# Patient Record
Sex: MALE | Race: WHITE | NOT HISPANIC OR LATINO | Employment: OTHER | URBAN - METROPOLITAN AREA
[De-identification: names, ages, dates, MRNs, and addresses within clinical notes are randomized per-mention and may not be internally consistent; named-entity substitution may affect disease eponyms.]

---

## 2024-09-22 ENCOUNTER — APPOINTMENT (OUTPATIENT)
Dept: RADIOLOGY | Facility: HOSPITAL | Age: 79
End: 2024-09-22
Payer: MEDICARE

## 2024-09-22 ENCOUNTER — HOSPITAL ENCOUNTER (EMERGENCY)
Facility: HOSPITAL | Age: 79
Discharge: HOME | End: 2024-09-22
Attending: STUDENT IN AN ORGANIZED HEALTH CARE EDUCATION/TRAINING PROGRAM
Payer: MEDICARE

## 2024-09-22 VITALS
SYSTOLIC BLOOD PRESSURE: 150 MMHG | OXYGEN SATURATION: 100 % | WEIGHT: 189 LBS | RESPIRATION RATE: 17 BRPM | DIASTOLIC BLOOD PRESSURE: 74 MMHG | HEART RATE: 57 BPM | HEIGHT: 74 IN | TEMPERATURE: 97.5 F | BODY MASS INDEX: 24.26 KG/M2

## 2024-09-22 DIAGNOSIS — S61.511A LACERATION OF RIGHT WRIST, INITIAL ENCOUNTER: Primary | ICD-10-CM

## 2024-09-22 PROCEDURE — 90715 TDAP VACCINE 7 YRS/> IM: CPT

## 2024-09-22 PROCEDURE — 2500000004 HC RX 250 GENERAL PHARMACY W/ HCPCS (ALT 636 FOR OP/ED)

## 2024-09-22 PROCEDURE — 73110 X-RAY EXAM OF WRIST: CPT | Mod: RT

## 2024-09-22 PROCEDURE — 73110 X-RAY EXAM OF WRIST: CPT | Mod: RIGHT SIDE | Performed by: RADIOLOGY

## 2024-09-22 PROCEDURE — 99283 EMERGENCY DEPT VISIT LOW MDM: CPT | Mod: 25

## 2024-09-22 PROCEDURE — 90471 IMMUNIZATION ADMIN: CPT

## 2024-09-22 PROCEDURE — 2500000001 HC RX 250 WO HCPCS SELF ADMINISTERED DRUGS (ALT 637 FOR MEDICARE OP)

## 2024-09-22 RX ORDER — LISINOPRIL 5 MG/1
5 TABLET ORAL 2 TIMES DAILY
COMMUNITY

## 2024-09-22 RX ORDER — BACITRACIN ZINC 500 UNIT/G
OINTMENT IN PACKET (EA) TOPICAL ONCE
Status: COMPLETED | OUTPATIENT
Start: 2024-09-22 | End: 2024-09-22

## 2024-09-22 RX ORDER — CLOPIDOGREL BISULFATE 75 MG/1
75 TABLET ORAL
COMMUNITY
Start: 2023-09-23

## 2024-09-22 RX ORDER — DONEPEZIL HYDROCHLORIDE 10 MG/1
10 TABLET, FILM COATED ORAL
COMMUNITY
Start: 2024-01-16

## 2024-09-22 RX ORDER — TAMSULOSIN HYDROCHLORIDE 0.4 MG/1
CAPSULE ORAL
COMMUNITY

## 2024-09-22 ASSESSMENT — COLUMBIA-SUICIDE SEVERITY RATING SCALE - C-SSRS
6. HAVE YOU EVER DONE ANYTHING, STARTED TO DO ANYTHING, OR PREPARED TO DO ANYTHING TO END YOUR LIFE?: NO
1. IN THE PAST MONTH, HAVE YOU WISHED YOU WERE DEAD OR WISHED YOU COULD GO TO SLEEP AND NOT WAKE UP?: NO
2. HAVE YOU ACTUALLY HAD ANY THOUGHTS OF KILLING YOURSELF?: NO

## 2024-09-22 ASSESSMENT — LIFESTYLE VARIABLES
EVER HAD A DRINK FIRST THING IN THE MORNING TO STEADY YOUR NERVES TO GET RID OF A HANGOVER: NO
TOTAL SCORE: 0
HAVE YOU EVER FELT YOU SHOULD CUT DOWN ON YOUR DRINKING: NO
EVER FELT BAD OR GUILTY ABOUT YOUR DRINKING: NO
HAVE PEOPLE ANNOYED YOU BY CRITICIZING YOUR DRINKING: NO

## 2024-09-22 ASSESSMENT — PAIN SCALES - GENERAL
PAINLEVEL_OUTOF10: 0 - NO PAIN
PAINLEVEL_OUTOF10: 0 - NO PAIN

## 2024-09-22 ASSESSMENT — PAIN DESCRIPTION - PAIN TYPE: TYPE: ACUTE PAIN

## 2024-09-22 ASSESSMENT — PAIN - FUNCTIONAL ASSESSMENT
PAIN_FUNCTIONAL_ASSESSMENT: 0-10
PAIN_FUNCTIONAL_ASSESSMENT: 0-10

## 2024-09-22 ASSESSMENT — PAIN DESCRIPTION - LOCATION: LOCATION: WRIST

## 2024-09-22 NOTE — DISCHARGE INSTRUCTIONS
I sent a referral to wound care clinic.  Please give the office a call and schedule appointment as soon as possible to further take care of your wound.    If you have any worsening symptoms of wrist pain, uncontrolled bleeding, weakness of your right hand, loss of sensation, fingers turning pale and blue, high fevers, or any other concerning symptoms, please return to the ED.     Thank you for allowing us to participate in your health care.    -[facility].

## 2024-09-22 NOTE — ED PROVIDER NOTES
EMERGENCY DEPARTMENT ENCOUNTER      Pt Name: Michi Rg  MRN: [id number]  Birthdate 1945  Date of evaluation: 9/22/2024  Provider: Susan Butts DO    CHIEF COMPLAINT       Chief Complaint   Patient presents with    Laceration     Right wrist/ hand. Cut on glass last night at 1030PM           HISTORY OF PRESENT ILLNESS    79-year-old male with past medical history of coronary artery disease status post stents placements on Plavix, who presents to the ED with laceration.  The patient states around 10:30 PM last night, having glass broke off and the glass shards lacerated his right wrist/hand.  He has 1 superficial laceration on the right wrist and 2 superficial lacerations on the hand/finger.  When the injury happens, wife dressed the wound and bleeding was controlled.  However, this morning while changing the dressing, bleeding recur and they were unable to stop it at home.  In the ED, he is hemodynamically stable.  The laceration on the wrist is oozing blood minimally.  He denies any fever, chills, weakness, loss of sensation, or increased pain.  He is concerned that there may be glass shards inside of the cut.       History provided by:  Patient and spouse   used: No        Nursing Notes were reviewed.    PAST MEDICAL HISTORY   History reviewed. No pertinent past medical history.      SURGICAL HISTORY       Past Surgical History:   Procedure Laterality Date    HERNIA REPAIR  04/10/2018    Hernia Repair    OTHER SURGICAL HISTORY  04/10/2018    Previous Stent Placement         CURRENT MEDICATIONS       Discharge Medication List as of 9/22/2024 12:53 PM        CONTINUE these medications which have NOT CHANGED    Details   clopidogrel (Plavix) 75 mg tablet Take 1 tablet (75 mg) by mouth., Starting Sat 9/23/2023, Historical Med      donepezil (Aricept) 10 mg tablet Take 1 tablet (10 mg) by mouth., Starting Tue 1/16/2024, Historical Med      aspirin-calcium carbonate 81 mg-300 mg  calcium(777 mg) tablet Take by mouth., Historical Med      FINASTERIDE ORAL Historical Med      lisinopril 5 mg tablet Take 1 tablet (5 mg) by mouth 2 times a day., Historical Med      tamsulosin (Flomax) 0.4 mg 24 hr capsule Take by mouth., Historical Med             ALLERGIES     Patient has no known allergies.    FAMILY HISTORY     No family history on file.       SOCIAL HISTORY       Social History     Socioeconomic History    Marital status: Single   Tobacco Use    Smoking status: Never    Smokeless tobacco: Never   Substance and Sexual Activity    Alcohol use: Yes     Alcohol/week: 7.0 standard drinks of alcohol     Types: 7 Glasses of wine per week    Drug use: Never     Social Determinants of Health     Financial Resource Strain: Low Risk  (9/12/2022)    Received from [facility]    Overall Financial Resource Strain (CARDIA)     Difficulty of Paying Living Expenses: Not hard at all   Food Insecurity: No Food Insecurity (9/12/2022)    Received from [facility]    Hunger Vital Sign     Worried About Running Out of Food in the Last Year: Never true     Ran Out of Food in the Last Year: Never true   Transportation Needs: No Transportation Needs (9/12/2022)    Received from [facility]    PRAPARE - Transportation     Lack of Transportation (Medical): No     Lack of Transportation (Non-Medical): No   Physical Activity: Insufficiently Active (11/16/2023)    Received from [facility]    Exercise Vital Sign     Days of Exercise per Week: 4 days     Minutes of Exercise per Session: 30 min   Stress: No Stress Concern Present (9/12/2022)    Received from [facility]    [employer] - Occupational Stress Questionnaire     Feeling of Stress : Only a little   Social Connections: Socially Integrated (9/12/2022)    Received from [facility]    Social Connection and Isolation Panel [NHANES]     Frequency of Communication with Friends and Family: More than three times a  week     Frequency of Social Gatherings with Friends and Family: Three times a week     Attends Christianity Services: More than 4 times per year     Active Member of Clubs or Organizations: Yes     Attends Club or Organization Meetings: 1 to 4 times per year     Marital Status:        PHYSICAL EXAM    (up to 7 for level 4, 8 or more for level 5)     ED Triage Vitals [09/22/24 0958]   Temperature Heart Rate Respirations BP   36 °C (96.8 °F) 56 16 (!) 151/95      Pulse Ox Temp Source Heart Rate Source Patient Position   99 % Temporal Monitor Sitting      BP Location FiO2 (%)     Left arm --       Physical Exam  Vitals and nursing note reviewed.   Constitutional:       General: He is not in acute distress.     Appearance: Normal appearance.   HENT:      Head: Normocephalic and atraumatic.   Eyes:      General: No scleral icterus.     Extraocular Movements: Extraocular movements intact.      Conjunctiva/sclera: Conjunctivae normal.   Cardiovascular:      Rate and Rhythm: Normal rate and regular rhythm.      Pulses: Normal pulses.      Heart sounds: Normal heart sounds. No murmur heard.  Pulmonary:      Effort: Pulmonary effort is normal. No respiratory distress.      Breath sounds: Normal breath sounds. No wheezing, rhonchi or rales.   Musculoskeletal:      Right wrist: Laceration and tenderness present. No swelling or bony tenderness. Normal range of motion. Normal pulse.      Right hand: Laceration present. Normal range of motion. Normal strength. Normal sensation. Normal capillary refill. Normal pulse.      Cervical back: Neck supple.      Comments: 1 cm superficial laceration on the volar aspect of the right wrist minimal active bleeding.  2x 0.5 cm superficial lacerations on the right hand.  He can flex and extend his right wrist well.  He can make an OK sign, thumb opposition/reposition, and abduction all his digits without difficulty.  Sensation is intact throughout.  Capillary refill is less than 2 seconds  and radial pulse is brisk.   Skin:     General: Skin is warm and dry.   Neurological:      Mental Status: He is alert and oriented to person, place, and time.   Psychiatric:         Mood and Affect: Mood normal.         Behavior: Behavior normal.        DIAGNOSTIC RESULTS     LABS:  Labs Reviewed - No data to display    All other labs were within normal range or not returned as of this dictation.    Imaging  XR wrist right 3+ views   Final Result   No acute osseous abnormality.  No radiopaque foreign body.   Signed by Kodak Gomez MD           Procedures  Procedures     EMERGENCY DEPARTMENT COURSE/MDM:     Diagnoses as of 09/22/24 1543   Laceration of right wrist, initial encounter        Medical Decision Making  This is a 79-year-old male with past medical history of CAD status post stents placement currently on Plavix, who presents to the ED with superficial lacerations on right upper extremity that he sustained from broken glass last evening at 10:30 PM.  Wife and patient states that they were able to control the bleeding last evening.  However, during the dressing changes this morning, bleeding recur and they were unable to control it at home.  In the ED, the patient is hemodynamically stable.  He does have a superficial laceration on the volar aspect of the right wrist that was actively oozing blood.  The other 2 superficial lacerations does not appear infected.  Right radial pulse is brisk.  Capillary refill is <2 seconds.  He can flex and extend his right wrist without any difficulty.  His right hand is neurovascularly intact.     Since the laceration occurred 14 hours ago and it has been exposed to air, repairing the laceration increases the risk of an infection.  I had risks and benefits discussion with the patient regarding laceration repair.  Patient opted not to have the laceration repaired given that it carries low risk of infection although bleeding takes some time to resolve.  At bedside, I was able  to achieve bleeding stasis following Surgicel with Cor-Flex and direct pressure to the laceration.  I also irrigated the wounds with saline solutions. Bacitracin were applied followed by dressing placement.  Tetanus vaccination was updated.    Aside from the bleeding, patient is concerned that he might have a glass shards inside of the laceration.  X-ray of the right wrist did not show any foreign body.    Since he has unremarkable x-ray of the right wrist and bleeding stasis was achieved, he is to be discharged home.  We sent a referral to wound care clinic, and advised the patient to follow-up with them for further management of his lacerations.  I advised them to return to the ER for any worsening/concerning symptoms.    Amount and/or Complexity of Data Reviewed  Radiology: ordered.      Patient and or family in agreement and understanding of treatment plan.  All questions answered.      I reviewed the case with the attending ED physician. The attending ED physician agrees with the plan. Patient and/or patient´s representative was counseled regarding labs, imaging, likely diagnosis, and plan. All questions were answered.    ED Medications administered this visit:    Medications   diphth,pertus(acell),tetanus (BoostRIX) 2.5-8-5 Lf-mcg-Lf/0.5mL vaccine 0.5 mL (0.5 mL intramuscular Given 9/22/24 1246)   bacitracin ointment (1 Application Topical Given 9/22/24 1246)       New Prescriptions from this visit:    Discharge Medication List as of 9/22/2024 12:53 PM          Follow-up:  [facility]  [address] [address] [address] [phone]  Schedule an appointment as soon as possible for a visit in 1 day          Final Impression:   1. Laceration of right wrist, initial encounter          (Please note that portions of this note were completed with a voice recognition program.  Efforts were made to edit the dictations but occasionally words are mis-transcribed.)     Susan Butts,  DO  Resident  09/22/24 1548